# Patient Record
Sex: MALE | Race: WHITE | URBAN - METROPOLITAN AREA
[De-identification: names, ages, dates, MRNs, and addresses within clinical notes are randomized per-mention and may not be internally consistent; named-entity substitution may affect disease eponyms.]

---

## 2018-04-06 ENCOUNTER — OFFICE VISIT (OUTPATIENT)
Dept: INTERNAL MEDICINE CLINIC | Age: 28
End: 2018-04-06

## 2018-04-06 VITALS
HEART RATE: 68 BPM | DIASTOLIC BLOOD PRESSURE: 64 MMHG | SYSTOLIC BLOOD PRESSURE: 102 MMHG | WEIGHT: 133 LBS | RESPIRATION RATE: 12 BRPM

## 2018-04-06 DIAGNOSIS — J96.11 CHRONIC HYPOXEMIC RESPIRATORY FAILURE (HCC): ICD-10-CM

## 2018-04-06 DIAGNOSIS — R06.89 ALVEOLAR HYPOVENTILATION: ICD-10-CM

## 2018-04-06 DIAGNOSIS — I10 ESSENTIAL HYPERTENSION: ICD-10-CM

## 2018-04-06 DIAGNOSIS — G71.01 DUCHENNE MUSCULAR DYSTROPHY (HCC): ICD-10-CM

## 2018-04-06 DIAGNOSIS — R60.0 EDEMA OF LOWER EXTREMITY: ICD-10-CM

## 2018-04-06 DIAGNOSIS — K05.10 GINGIVITIS, CHRONIC: ICD-10-CM

## 2018-04-06 DIAGNOSIS — Z78.9 ADVANCE DIRECTIVE IN CHART: ICD-10-CM

## 2018-04-06 DIAGNOSIS — G47.33 OBSTRUCTIVE SLEEP APNEA: ICD-10-CM

## 2018-04-06 DIAGNOSIS — R00.0 SINUS TACHYCARDIA: ICD-10-CM

## 2018-04-06 DIAGNOSIS — I51.4 MYOCARDIAL FIBROSIS (HCC): ICD-10-CM

## 2018-04-06 PROBLEM — I42.5 OTHER RESTRICTIVE CARDIOMYOPATHY (HCC): Status: ACTIVE | Noted: 2018-04-06

## 2018-04-06 PROCEDURE — 99203 OFFICE O/P NEW LOW 30 MIN: CPT | Performed by: INTERNAL MEDICINE

## 2018-04-06 RX ORDER — CHLORHEXIDINE GLUCONATE 0.12 MG/ML
15 RINSE ORAL 2 TIMES DAILY
Qty: 1800 ML | Refills: 5 | Status: SHIPPED | OUTPATIENT
Start: 2018-04-06 | End: 2018-05-06

## 2018-04-06 RX ORDER — LORATADINE 10 MG/1
10 CAPSULE, LIQUID FILLED ORAL DAILY
COMMUNITY

## 2018-04-06 RX ORDER — FLUTICASONE PROPIONATE 50 MCG
2 SPRAY, SUSPENSION (ML) NASAL DAILY
COMMUNITY

## 2018-04-06 RX ORDER — POLYETHYLENE GLYCOL 3350 17 G/17G
17 POWDER, FOR SOLUTION ORAL DAILY
COMMUNITY
End: 2018-07-25 | Stop reason: SDUPTHER

## 2018-04-06 RX ORDER — LANOLIN/MINERAL OIL
LOTION (ML) TOPICAL PRN
COMMUNITY
End: 2019-05-15

## 2018-04-06 RX ORDER — SPIRONOLACTONE 25 MG/1
25 TABLET ORAL DAILY
COMMUNITY

## 2018-04-06 RX ORDER — ALENDRONATE SODIUM 70 MG/1
1 TABLET ORAL WEEKLY
COMMUNITY
Start: 2018-04-04

## 2018-04-06 RX ORDER — PREDNISONE 20 MG/1
20 TABLET ORAL DAILY
COMMUNITY

## 2018-04-06 RX ORDER — CARVEDILOL 12.5 MG/1
6.25 TABLET ORAL 2 TIMES DAILY
COMMUNITY
Start: 2018-04-04

## 2018-04-06 RX ORDER — MUPIROCIN CALCIUM 20 MG/G
CREAM TOPICAL DAILY
COMMUNITY
End: 2019-05-15 | Stop reason: ALTCHOICE

## 2018-04-06 RX ORDER — OMEPRAZOLE 20 MG/1
20 CAPSULE, DELAYED RELEASE ORAL DAILY
COMMUNITY
End: 2019-05-15 | Stop reason: ALTCHOICE

## 2018-04-06 RX ORDER — SIMETHICONE 80 MG
80 TABLET,CHEWABLE ORAL EVERY 6 HOURS PRN
COMMUNITY
End: 2019-01-25 | Stop reason: SDUPTHER

## 2018-04-06 RX ORDER — LISINOPRIL 10 MG/1
10 TABLET ORAL DAILY
COMMUNITY
End: 2019-05-15 | Stop reason: DRUGHIGH

## 2018-04-06 RX ORDER — DOCUSATE SODIUM 50 MG/5ML
50 LIQUID ORAL DAILY
COMMUNITY
End: 2019-05-15 | Stop reason: ALTCHOICE

## 2018-04-06 RX ORDER — MELATONIN
2 DAILY
COMMUNITY
Start: 2018-03-26

## 2018-04-06 RX ORDER — MENTHOL AND ZINC OXIDE .44; 20.625 G/100G; G/100G
OINTMENT TOPICAL DAILY
COMMUNITY

## 2018-04-06 ASSESSMENT — ENCOUNTER SYMPTOMS: SHORTNESS OF BREATH: 0

## 2018-05-09 ENCOUNTER — OFFICE VISIT (OUTPATIENT)
Dept: INTERNAL MEDICINE CLINIC | Age: 28
End: 2018-05-09

## 2018-05-09 ENCOUNTER — TELEPHONE (OUTPATIENT)
Dept: INTERNAL MEDICINE CLINIC | Age: 28
End: 2018-05-09

## 2018-05-09 VITALS
DIASTOLIC BLOOD PRESSURE: 64 MMHG | SYSTOLIC BLOOD PRESSURE: 112 MMHG | HEART RATE: 76 BPM | OXYGEN SATURATION: 99 % | RESPIRATION RATE: 12 BRPM

## 2018-05-09 DIAGNOSIS — B36.9 FUNGAL DERMATITIS: ICD-10-CM

## 2018-05-09 DIAGNOSIS — R60.0 EDEMA OF LOWER EXTREMITY: Primary | ICD-10-CM

## 2018-05-09 PROCEDURE — G8421 BMI NOT CALCULATED: HCPCS | Performed by: INTERNAL MEDICINE

## 2018-05-09 PROCEDURE — 99214 OFFICE O/P EST MOD 30 MIN: CPT | Performed by: INTERNAL MEDICINE

## 2018-05-09 PROCEDURE — G8427 DOCREV CUR MEDS BY ELIG CLIN: HCPCS | Performed by: INTERNAL MEDICINE

## 2018-05-09 PROCEDURE — 1036F TOBACCO NON-USER: CPT | Performed by: INTERNAL MEDICINE

## 2018-05-09 RX ORDER — TAMSULOSIN HYDROCHLORIDE 0.4 MG/1
0.4 CAPSULE ORAL DAILY
COMMUNITY
End: 2019-05-15 | Stop reason: ALTCHOICE

## 2018-05-09 RX ORDER — ELASTIC BANDAGE 3"
BANDAGE TOPICAL
Qty: 6 EACH | Refills: 0 | Status: SHIPPED | OUTPATIENT
Start: 2018-05-09

## 2018-05-09 RX ORDER — NYSTATIN 100000 [USP'U]/G
POWDER TOPICAL
Qty: 57.6 G | Refills: 5 | Status: SHIPPED | OUTPATIENT
Start: 2018-05-09

## 2018-05-10 PROBLEM — B36.9 FUNGAL DERMATITIS: Status: ACTIVE | Noted: 2018-05-10

## 2018-05-10 ASSESSMENT — ENCOUNTER SYMPTOMS
COLOR CHANGE: 0
SHORTNESS OF BREATH: 0
COUGH: 0
ABDOMINAL DISTENTION: 0

## 2018-06-25 ENCOUNTER — TELEPHONE (OUTPATIENT)
Dept: INTERNAL MEDICINE CLINIC | Age: 28
End: 2018-06-25

## 2018-06-27 ENCOUNTER — OFFICE VISIT (OUTPATIENT)
Dept: INTERNAL MEDICINE CLINIC | Age: 28
End: 2018-06-27

## 2018-06-27 VITALS — RESPIRATION RATE: 12 BRPM | DIASTOLIC BLOOD PRESSURE: 64 MMHG | SYSTOLIC BLOOD PRESSURE: 102 MMHG | HEART RATE: 78 BPM

## 2018-06-27 DIAGNOSIS — I89.0 LYMPHEDEMA OF BOTH LOWER EXTREMITIES: Primary | ICD-10-CM

## 2018-06-27 PROCEDURE — 99215 OFFICE O/P EST HI 40 MIN: CPT | Performed by: INTERNAL MEDICINE

## 2018-06-27 PROCEDURE — G8427 DOCREV CUR MEDS BY ELIG CLIN: HCPCS | Performed by: INTERNAL MEDICINE

## 2018-06-27 PROCEDURE — 1036F TOBACCO NON-USER: CPT | Performed by: INTERNAL MEDICINE

## 2018-06-27 PROCEDURE — G8421 BMI NOT CALCULATED: HCPCS | Performed by: INTERNAL MEDICINE

## 2018-06-27 RX ORDER — FERROUS SULFATE 325(65) MG
325 TABLET ORAL 2 TIMES DAILY
COMMUNITY
End: 2019-05-15 | Stop reason: ALTCHOICE

## 2018-06-27 ASSESSMENT — PATIENT HEALTH QUESTIONNAIRE - PHQ9
SUM OF ALL RESPONSES TO PHQ QUESTIONS 1-9: 0
1. LITTLE INTEREST OR PLEASURE IN DOING THINGS: 0
2. FEELING DOWN, DEPRESSED OR HOPELESS: 0
SUM OF ALL RESPONSES TO PHQ9 QUESTIONS 1 & 2: 0

## 2018-06-28 ASSESSMENT — ENCOUNTER SYMPTOMS: SHORTNESS OF BREATH: 1

## 2018-07-02 ENCOUNTER — TELEPHONE (OUTPATIENT)
Dept: INTERNAL MEDICINE CLINIC | Age: 28
End: 2018-07-02

## 2018-07-11 ENCOUNTER — OFFICE VISIT (OUTPATIENT)
Dept: INTERNAL MEDICINE CLINIC | Age: 28
End: 2018-07-11

## 2018-07-11 VITALS
HEART RATE: 72 BPM | DIASTOLIC BLOOD PRESSURE: 62 MMHG | OXYGEN SATURATION: 98 % | RESPIRATION RATE: 12 BRPM | SYSTOLIC BLOOD PRESSURE: 100 MMHG

## 2018-07-11 DIAGNOSIS — G71.01 DUCHENNE MUSCULAR DYSTROPHY (HCC): ICD-10-CM

## 2018-07-11 DIAGNOSIS — I89.0 LYMPHEDEMA OF BOTH LOWER EXTREMITIES: Primary | ICD-10-CM

## 2018-07-11 DIAGNOSIS — J96.11 CHRONIC HYPOXEMIC RESPIRATORY FAILURE (HCC): ICD-10-CM

## 2018-07-11 DIAGNOSIS — I51.4 MYOCARDIAL FIBROSIS (HCC): ICD-10-CM

## 2018-07-11 PROCEDURE — 1036F TOBACCO NON-USER: CPT | Performed by: INTERNAL MEDICINE

## 2018-07-11 PROCEDURE — G8427 DOCREV CUR MEDS BY ELIG CLIN: HCPCS | Performed by: INTERNAL MEDICINE

## 2018-07-11 PROCEDURE — 1111F DSCHRG MED/CURRENT MED MERGE: CPT | Performed by: INTERNAL MEDICINE

## 2018-07-11 PROCEDURE — 99214 OFFICE O/P EST MOD 30 MIN: CPT | Performed by: INTERNAL MEDICINE

## 2018-07-11 PROCEDURE — G8421 BMI NOT CALCULATED: HCPCS | Performed by: INTERNAL MEDICINE

## 2018-07-11 ASSESSMENT — ENCOUNTER SYMPTOMS
CHEST TIGHTNESS: 0
SHORTNESS OF BREATH: 0

## 2018-07-11 NOTE — ASSESSMENT & PLAN NOTE
On sip vent prn during the day with cough assist after lunch and at HS. Managed by Dr. Tosha Cr at [address]. Also on BiPap over night.  Related to DMD.

## 2018-07-11 NOTE — PROGRESS NOTES
Patient: Winston Arteaga is a 33 y.o. male who presents today with the following Chief Complaint(s):  Chief Complaint   Patient presents with   [address] from [facility]     d/c from [facility]. [facility] [address] 7/5/18    Edema     edema both feet       [facility] for significant LE edema. Was diuresed about 3 liters with Lasix and elevation of legs. He did have a \"bump\" in his creatinine with diuresis. Re-checked yesterday and was back down. Was not discharged home on Lasix. Had echo done 6/28 that was normal. Had LE dopplers done that were negative. Had lymphedema pumps in the hospital and a hospital bed. There has been no progress on getting him a hospital bed: needs hospital bed with adjustable positions, adjustable height and needs an air mattress to help avoid bed sores as he is not able to shift positions. Does have a hospital bed at home that he will not get in as he does not like the mattress and does not fully adjust to elevate his feet, bends at knees. Has appointment with Dr. Yobani Dukes (vasular) at [facility] 8/14. Since discharge home, feet have been swelling more. Face to face evaluation for hospital bed and lymph edema pump. Needs hospital bed with adjustable positions d/t lymphedema and DMD. Unable to position self in bed d/t musclar dystrophy. Unable to elevated legs adequately in wheel chair or current hospital bed. Will likely need frequent admissions for lymphedema w/out appropriate hospital bed. Needs lymphedema pumps for significant swelling of feet. Unable to use stockings d/t position of swelling (severe swelling in feet) and has failed wraps. Will likely need readmission without home treatment.          No Known Allergies   Past Medical History:   Diagnosis Date    Hypertension     Muscular dystrophy [facility])       Past Surgical History:   Procedure Laterality Date    GASTROSTOMY TUBE PLACEMENT  04/2017    closure 3/2018    SPINAL FUSION 2004      Social History     Social History    Marital status: Single     Spouse name: N/A    Number of children: N/A    Years of education: N/A     Occupational History    unemployed      Social History Main Topics    Smoking status: Never Smoker    Smokeless tobacco: Never Used    Alcohol use Yes      Comment: rarely    Drug use: No    Sexual activity: Not on file     Other Topics Concern    Not on file     Social History Narrative    No narrative on file     Family History   Problem Relation Age of Onset    Other Mother 35        car accident    Cancer Father 46        Leukemia    Dementia Paternal Grandmother     Lung Cancer Paternal Grandfather         Outpatient Medications Prior to Visit   Medication Sig Dispense Refill    ferrous sulfate 325 (65 Fe) MG tablet Take 325 mg by mouth 2 times daily      tamsulosin (FLOMAX) 0.4 MG capsule Take 0.4 mg by mouth daily      nystatin (MYCOSTATIN) 724923 UNIT/GM powder Apply 3 times daily. 57.6 g 5    Elastic Bandages & Supports (ACE BANDAGE SELF-ADHERING) MISC Comprilan wraps or similar. Will need 2-3 per leg. Wrap each leg in am starting at toes up to knees. Remove at HS. 6 each 0    docusate sodium (COLACE) 150 MG/15ML liquid Take 50 mg by mouth daily      fluticasone (FLONASE) 50 MCG/ACT nasal spray 2 sprays by Nasal route daily      simethicone (GAS RELIEF) 80 MG chewable tablet Take 80 mg by mouth every 6 hours as needed for Flatulence      lisinopril (PRINIVIL;ZESTRIL) 10 MG tablet Take 10 mg by mouth daily      loratadine (CLARITIN) 10 MG capsule Take 10 mg by mouth daily      Melatonin 1 MG/ML LIQD Take 6 mg by mouth nightly as needed      menthol-zinc oxide (CALMOSEPTINE) 0.44-20.625 % OINT ointment Apply topically daily Max 30 ml per day.  miconazole nitrate 2 % OINT Apply topically daily as needed      mupirocin (BACTROBAN) 2 % cream Apply topically daily Apply topically 3 times daily.       omeprazole (PRILOSEC) 20 MG delayed release capsule Take 20 mg by mouth daily      petrolatum-zinc oxide 49-15 % OINT ointment Apply topically as needed      polyethylene glycol (GLYCOLAX) powder Take 17 g by mouth daily      predniSONE (DELTASONE) 20 MG tablet Take 20 mg by mouth daily      spironolactone (ALDACTONE) 25 MG tablet Take 25 mg by mouth daily      therapeutic (THERA-DERM) LOTN Apply topically as needed      alendronate (FOSAMAX) 70 MG tablet Take 1 tablet by mouth once a week      carvedilol (COREG) 12.5 MG tablet Take 1 tablet by mouth 2 times daily      Cholecalciferol (VITAMIN D3) 1000 units TABS Take 2 tablets by mouth daily       No facility-administered medications prior to visit. Patient's past medical history, surgical history, family history, medications,  and allergies  were all reviewed and updated as appropriate today. Review of Systems   Constitutional: Negative for appetite change, fatigue and fever. Respiratory: Negative for chest tightness and shortness of breath. Cardiovascular: Positive for leg swelling. Negative for chest pain. Skin: Negative for rash. /62 (Site: Right Arm)   Pulse 72   Resp 12   SpO2 98%   Physical Exam   Constitutional: He is oriented to person, place, and time. He appears well-developed and well-nourished. He is cooperative. He does not appear ill. No distress. Wheelchair bound, sip vent dependent   HENT:   Head: Normocephalic. Right Ear: Tympanic membrane, external ear and ear canal normal.   Left Ear: Tympanic membrane, external ear and ear canal normal.   Nose: Nose normal. No mucosal edema or rhinorrhea. Mouth/Throat: Oropharynx is clear and moist and mucous membranes are normal.   Gingivitis, plaque build up on teeth. Eyes: Conjunctivae and EOM are normal. Pupils are equal, round, and reactive to light. Right eye exhibits no discharge. Left eye exhibits no discharge. No scleral icterus. Neck: Carotid bruit is not present.  No thyroid mass present. Cardiovascular: Normal rate, regular rhythm, normal heart sounds and intact distal pulses. No murmur heard. Left lower leg with no edema, foot with 4+ edema, overlying skin not shiny, no breakdown, no erythema    Right lower leg with 1-2+ pitting edema from mid-calf to ankle, foot and ankle with 5+ tense edema. Overlying skin is shiny around ankle and over top of foot. No break down but medial malleolus area with 3 cm blister with likely bloody fluid. Swelling is slightly less than it was on 6/27/18. Pulmonary/Chest: Effort normal. He has no wheezes. He has no rales. He exhibits no tenderness. Abdominal: Soft. Bowel sounds are normal. He exhibits no mass. There is no tenderness. Musculoskeletal: Edema: b/l LE. Left lower leg with no edema, foot with 5+ edema, overlying skin not shiny, no breakdown, no erythema    Right lower leg with 1-2+ pitting edema from mid-calf to ankle, foot and ankle with 5+ tense edema. Overlying skin is shiny around ankle and over top of foot. No break down but medial malleolus area with 3 cm blister with likely bloody fluid. Neurological: He is alert and oriented to person, place, and time. He displays atrophy. He exhibits abnormal muscle tone (flaccid). Gait (non-ambulatory) abnormal.   Arm strength 2/5 b/l. Trunk weakness/some difficulty controlling trunk movements. Speech weak   Skin: Skin is warm and dry. No pallor. Skin over right foot is shiny, has about a 3 cm sized raised area over his medial malleolus that looks like a blister forming with blood vessels underneath. Skin over left foot is not shiny. No skin breakdown. Psychiatric: He has a normal mood and affect. His behavior is normal. Judgment and thought content normal.       ASSESSMENT/PLAN:    Problem List Items Addressed This Visit     Chronic hypoxemic respiratory failure (HCC)     On sip vent prn during the day with cough assist after lunch and at HS.  [facility] Also on BiPap over night. Related to DMD.         Relevant Orders    DME Order for Hospital Bed as OP    Duchenne muscular dystrophy ([address]     Diagnosed at age 12. He continues to follow with neurology at [facility].  He requires a hospital bed with a pressure relieving mattress as he is unable to adjust his position due to significant muscle weakness and lack of muscle control. He requires adjustable positioning of the bed to elevate his head and feet as needed. Relevant Orders    DC DISCHARGE MEDS RECONCILED W/ CURRENT OUTPATIENT MED LIST (Completed)    DME Order for Hospital Bed as OP    Lymphedema of both lower extremities - Primary     Per caregiver his lymphedema had significantly improved while he was in the hospital.  He was discharged home on Thursday, 7/5/18 and 5 days later, on 7/10/18, his feet look almost as bad as they did the day prior to his admission to [facility].  He has not had his feet elevated since discharge. He has not had lymphedema pumps since discharge. He was discharged home with elastic compression wraps but he has been unable to use these. In the hospital he had an echocardiogram done with normal biventricular function and normal ejection fraction. He had lower extremity Dopplers that were done that were unremarkable. He was treated with elevation of his legs, lymphedema pump, and Lasix. He was not discharged home with Lasix as Lasix was causing hypokalemia. Lasix would not be appropriate as his edema is secondary to extravascular volume,  not intravascular volume and Lasix would make him intravascularly deplete. He has an appointment with Dr. India Gunderson in vascular surgery at [facility] on 8/14/18 but my concern is if he has not seen sooner or he will end up back in the hospital with significant edema. We will try to see if we can move this appointment sooner.   In the meantime he is definitely appropriate for a hospital bed that is  mupirocin (BACTROBAN) 2 % cream Apply topically daily Apply topically 3 times daily.  omeprazole (PRILOSEC) 20 MG delayed release capsule Take 20 mg by mouth daily      petrolatum-zinc oxide 49-15 % OINT ointment Apply topically as needed      polyethylene glycol (GLYCOLAX) powder Take 17 g by mouth daily      predniSONE (DELTASONE) 20 MG tablet Take 20 mg by mouth daily      spironolactone (ALDACTONE) 25 MG tablet Take 25 mg by mouth daily      therapeutic (THERA-DERM) LOTN Apply topically as needed      alendronate (FOSAMAX) 70 MG tablet Take 1 tablet by mouth once a week      carvedilol (COREG) 12.5 MG tablet Take 1 tablet by mouth 2 times daily      Cholecalciferol (VITAMIN D3) 1000 units TABS Take 2 tablets by mouth daily       No current facility-administered medications for this visit. Return in about 4 weeks (around 8/8/2018) for sooner if needed.        Post-Discharge Transitional Care Management Services      [facility] Konstantin Nelson   YOB: 1990    Date of Office Visit:  7/11/2018  Date of Hospital Admission: 6/28/18  Date of Hospital Discharge: 7/5/18      Care management risk score Rising risk (score 2-5) and Complex Care (Scores >=6): 4       Patient Active Problem List   Diagnosis    Myocardial fibrosis ([address]    Duchenne muscular dystrophy ([address]    Essential hypertension    Sinus tachycardia    Gingivitis, chronic    Lymphedema of both lower extremities    Alveolar hypoventilation    Chronic hypoxemic respiratory failure ([address]    Obstructive sleep apnea    Advance directive in chart    Fungal dermatitis       No Known Allergies    Medications listed as ordered at the time of discharge from hospital  No new medications    Medications marked \"taking\" at this time  Outpatient Prescriptions Marked as Taking for the 7/11/18 encounter (Office Visit) with Larissa Millard, DO   Medication Sig Dispense Refill    ferrous sulfate 325 (65 Fe) MG tablet Take 325 mg

## 2018-07-11 NOTE — ASSESSMENT & PLAN NOTE
Diagnosed at age 12. He continues to follow with neurology at [facility].  He requires a hospital bed with a pressure relieving mattress as he is unable to adjust his position due to significant muscle weakness and lack of muscle control. He requires adjustable positioning of the bed to elevate his head and feet as needed.

## 2018-07-17 ENCOUNTER — TELEPHONE (OUTPATIENT)
Dept: INTERNAL MEDICINE CLINIC | Age: 28
End: 2018-07-17

## 2018-07-25 RX ORDER — POLYETHYLENE GLYCOL 3350 17 G/17G
17 POWDER, FOR SOLUTION ORAL DAILY
Qty: 527 G | Refills: 5 | Status: SHIPPED | OUTPATIENT
Start: 2018-07-25 | End: 2019-01-02 | Stop reason: SDUPTHER

## 2018-08-08 ENCOUNTER — OFFICE VISIT (OUTPATIENT)
Dept: INTERNAL MEDICINE CLINIC | Age: 28
End: 2018-08-08

## 2018-08-08 VITALS
SYSTOLIC BLOOD PRESSURE: 102 MMHG | HEART RATE: 70 BPM | RESPIRATION RATE: 12 BRPM | OXYGEN SATURATION: 98 % | DIASTOLIC BLOOD PRESSURE: 64 MMHG

## 2018-08-08 DIAGNOSIS — R30.0 DYSURIA: ICD-10-CM

## 2018-08-08 DIAGNOSIS — I89.0 LYMPHEDEMA OF BOTH LOWER EXTREMITIES: Primary | ICD-10-CM

## 2018-08-08 DIAGNOSIS — S91.301A OPEN WOUND OF RIGHT FOOT, INITIAL ENCOUNTER: ICD-10-CM

## 2018-08-08 PROBLEM — N30.00 ACUTE CYSTITIS WITHOUT HEMATURIA: Status: ACTIVE | Noted: 2018-08-08

## 2018-08-08 PROCEDURE — G8421 BMI NOT CALCULATED: HCPCS | Performed by: INTERNAL MEDICINE

## 2018-08-08 PROCEDURE — 1036F TOBACCO NON-USER: CPT | Performed by: INTERNAL MEDICINE

## 2018-08-08 PROCEDURE — G8427 DOCREV CUR MEDS BY ELIG CLIN: HCPCS | Performed by: INTERNAL MEDICINE

## 2018-08-08 PROCEDURE — 99214 OFFICE O/P EST MOD 30 MIN: CPT | Performed by: INTERNAL MEDICINE

## 2018-08-08 RX ORDER — GAUZE BANDAGE 4" X 4"
1 SPONGE TOPICAL DAILY
Qty: 30 EACH | Refills: 2 | Status: SHIPPED | OUTPATIENT
Start: 2018-08-08

## 2018-08-08 ASSESSMENT — ENCOUNTER SYMPTOMS
SHORTNESS OF BREATH: 0
DIARRHEA: 0
CONSTIPATION: 0
CHEST TIGHTNESS: 0

## 2018-08-08 NOTE — PROGRESS NOTES
history, surgical history, family history, medications,  and allergies  were all reviewed and updated as appropriate today. Review of Systems   Constitutional: Negative for appetite change, fatigue and fever. Respiratory: Negative for chest tightness and shortness of breath. Cardiovascular: Negative for chest pain. Gastrointestinal: Negative for constipation and diarrhea. Skin: Negative for rash. /64 (Site: Right Arm, Position: Sitting)   Pulse 70   Resp 12   SpO2 98%   Physical Exam   Constitutional: He is oriented to person, place, and time. He appears well-developed and well-nourished. He is cooperative. He does not appear ill. No distress. Wheelchair bound, sip vent dependent   HENT:   Head: Normocephalic. Right Ear: Tympanic membrane and ear canal normal.   Left Ear: Tympanic membrane and ear canal normal.   Nose: No mucosal edema or rhinorrhea. Mouth/Throat: Mucous membranes are normal.   Gingivitis, plaque build up on teeth. Eyes: Conjunctivae and EOM are normal. Pupils are equal, round, and reactive to light. Right eye exhibits no discharge. Left eye exhibits no discharge. No scleral icterus. Neck: Carotid bruit is not present. No thyroid mass present. Cardiovascular: Normal rate, regular rhythm, normal heart sounds and intact distal pulses. No murmur heard. Left lower leg with no edema, foot with 4+ edema, overlying skin not shiny, no breakdown, no erythema    Right lower leg with 1-2+ pitting edema from distal 1/3 calf/shin to ankle, foot and ankle with 5+ tense edema. Overlying skin is not shiny around ankle and over top of foot. Swelling is slightly less than it was in July. Pulmonary/Chest: Effort normal. He has no wheezes. He has no rales. He exhibits no tenderness. Abdominal: Soft. Bowel sounds are normal. He exhibits no mass. There is no tenderness. Musculoskeletal: Edema: b/l LE.    Left lower leg with no edema, foot with 5+ edema, overlying skin not shiny, no breakdown, no erythema    Right lower leg with 1-2+ pitting edema from mid-calf to ankle, foot and ankle with 5+ tense edema. Overlying skin is shiny around ankle and over top of foot. No break down but medial malleolus area with 3 cm blister with likely bloody fluid. Neurological: He is alert and oriented to person, place, and time. He displays atrophy. He exhibits abnormal muscle tone (flaccid). Gait (non-ambulatory) abnormal.   Arm strength 2/5 b/l. Trunk weakness/some difficulty controlling trunk movements. Speech weak   Skin: Skin is warm and dry. No pallor. Skin over right foot is not shiny, has about a 5 cm sized open area with 3cm white central eschar, leaking serous fluid. Foot and lower leg to distal 1/3 of shin are pink. Does not appear cellulitic. Left foot with significant swelling, not shiny, not tense. Psychiatric: He has a normal mood and affect. His behavior is normal. Judgment and thought content normal.       ASSESSMENT/PLAN:    Problem List Items Addressed This Visit     Lymphedema of both lower extremities - Primary     Relatively stable at present. He still is not sleeping in bed and is not getting his legs elevated which is certainly worsening his condition. The swelling is located only to his feet at this time. Of concern is he does have an open area on his left medial foot that we are dressing with wound care. He does have an appointment with vascular Week. And he does use compression wraps to help with the swelling. Open wound of right foot     Wound cultures obtained. Wound dressed with Adaptic and PolyMem pink. Advised against use of hydrogen peroxide but okay to use over-the-counter topical antibiotic cream.  Referral placed for wound management with his home health agency [facility]).  Does not appear to be infected based on exam.         Relevant Orders    External Referral To [facility] CULTURE    Dysuria Most recent urine cultures are from 7/4/18 and showed E. coli which was resistant to most oral medications. He was treated with Keflex 500 mg b.i.d. Cultures do show that it was sensitive to cefazolin so the Keflex should have helped. Macrobid seemed to be the only oral medication that this particular strain of E. coli was sensitive to so we'll treat empirically with E. coli. I have given them a urine cup to take home with them and an order for UA with [facility] as he is unable to provide a sample today in the office. Relevant Orders    URINE CULTURE    Urinalysis          Current Outpatient Prescriptions   Medication Sig Dispense Refill    Gauze Pads & Dressings (POLYMEM DRESSING) 5\"X5\" PADS 1 each by Does not apply route daily 30 each 2    Wound Dressings (ADAPTIC NON-ADHERING DRESSING) PADS Apply 1 each topically daily 30 each 2    polyethylene glycol (GLYCOLAX) powder Take 17 g by mouth daily 527 g 5    ferrous sulfate 325 (65 Fe) MG tablet Take 325 mg by mouth 2 times daily      tamsulosin (FLOMAX) 0.4 MG capsule Take 0.4 mg by mouth daily      nystatin (MYCOSTATIN) 515497 UNIT/GM powder Apply 3 times daily. 57.6 g 5    Elastic Bandages & Supports (ACE BANDAGE SELF-ADHERING) MISC Comprilan wraps or similar. Will need 2-3 per leg. Wrap each leg in am starting at toes up to knees. Remove at HS. 6 each 0    docusate sodium (COLACE) 150 MG/15ML liquid Take 50 mg by mouth daily      fluticasone (FLONASE) 50 MCG/ACT nasal spray 2 sprays by Nasal route daily      simethicone (GAS RELIEF) 80 MG chewable tablet Take 80 mg by mouth every 6 hours as needed for Flatulence      lisinopril (PRINIVIL;ZESTRIL) 10 MG tablet Take 10 mg by mouth daily      loratadine (CLARITIN) 10 MG capsule Take 10 mg by mouth daily      Melatonin 1 MG/ML LIQD Take 6 mg by mouth nightly as needed      menthol-zinc oxide (CALMOSEPTINE) 0.44-20.625 % OINT ointment Apply topically daily Max 30 ml per day.       miconazole nitrate 2 % OINT Apply topically daily as needed      mupirocin (BACTROBAN) 2 % cream Apply topically daily Apply topically 3 times daily.  omeprazole (PRILOSEC) 20 MG delayed release capsule Take 20 mg by mouth daily      petrolatum-zinc oxide 49-15 % OINT ointment Apply topically as needed      predniSONE (DELTASONE) 20 MG tablet Take 20 mg by mouth daily      spironolactone (ALDACTONE) 25 MG tablet Take 25 mg by mouth daily      therapeutic (THERA-DERM) LOTN Apply topically as needed      alendronate (FOSAMAX) 70 MG tablet Take 1 tablet by mouth once a week      carvedilol (COREG) 12.5 MG tablet Take 1 tablet by mouth 2 times daily      Cholecalciferol (VITAMIN D3) 1000 units TABS Take 2 tablets by mouth daily       No current facility-administered medications for this visit. Return in about 4 weeks (around 9/5/2018).

## 2018-08-08 NOTE — ASSESSMENT & PLAN NOTE
Wound cultures obtained. Wound dressed with Adaptic and PolyMem pink. Advised against use of hydrogen peroxide but okay to use over-the-counter topical antibiotic cream.  Referral placed for wound management with his home health agency [facility]).  Does not appear to be infected based on exam.

## 2018-08-09 ENCOUNTER — TELEPHONE (OUTPATIENT)
Dept: INTERNAL MEDICINE CLINIC | Age: 28
End: 2018-08-09

## 2018-08-09 DIAGNOSIS — R30.0 DYSURIA: ICD-10-CM

## 2018-08-09 DIAGNOSIS — N30.00 ACUTE CYSTITIS WITHOUT HEMATURIA: Primary | ICD-10-CM

## 2018-08-09 LAB
BACTERIA: ABNORMAL /HPF
BILIRUBIN URINE: NEGATIVE
BLOOD, URINE: NEGATIVE
CLARITY: CLEAR
COLOR: YELLOW
CRYSTALS, UA: ABNORMAL /HPF
EPITHELIAL CELLS, UA: 1 /HPF (ref 0–5)
GLUCOSE URINE: NEGATIVE MG/DL
HYALINE CASTS: 9 /LPF (ref 0–8)
KETONES, URINE: NEGATIVE MG/DL
LEUKOCYTE ESTERASE, URINE: ABNORMAL
MICROSCOPIC EXAMINATION: YES
NITRITE, URINE: NEGATIVE
PH UA: >=9
PROTEIN UA: 100 MG/DL
RBC UA: 364 /HPF (ref 0–4)
SPECIFIC GRAVITY UA: <=1.005
URINE TYPE: ABNORMAL
UROBILINOGEN, URINE: 0.2 E.U./DL
WBC UA: 105 /HPF (ref 0–5)

## 2018-08-09 RX ORDER — NITROFURANTOIN 25; 75 MG/1; MG/1
100 CAPSULE ORAL 2 TIMES DAILY
Qty: 20 CAPSULE | Refills: 0 | Status: SHIPPED | OUTPATIENT
Start: 2018-08-09 | End: 2018-08-19

## 2018-08-10 ENCOUNTER — TELEPHONE (OUTPATIENT)
Dept: INTERNAL MEDICINE CLINIC | Age: 28
End: 2018-08-10

## 2018-08-10 DIAGNOSIS — S91.301D OPEN WOUND OF RIGHT FOOT, SUBSEQUENT ENCOUNTER: Primary | ICD-10-CM

## 2018-08-10 DIAGNOSIS — G71.01 DUCHENNE MUSCULAR DYSTROPHY (HCC): ICD-10-CM

## 2018-08-10 DIAGNOSIS — S91.301A OPEN WOUND OF RIGHT FOOT, INITIAL ENCOUNTER: Primary | ICD-10-CM

## 2018-08-10 RX ORDER — AMOXICILLIN AND CLAVULANATE POTASSIUM 875; 125 MG/1; MG/1
1 TABLET, FILM COATED ORAL 2 TIMES DAILY
Qty: 14 TABLET | Refills: 0 | Status: SHIPPED | OUTPATIENT
Start: 2018-08-10 | End: 2018-08-17

## 2018-08-10 RX ORDER — CIPROFLOXACIN 500 MG/1
500 TABLET, FILM COATED ORAL 2 TIMES DAILY
Qty: 14 TABLET | Refills: 0 | Status: SHIPPED | OUTPATIENT
Start: 2018-08-10 | End: 2018-08-17

## 2018-08-12 LAB
ORGANISM: ABNORMAL
URINE CULTURE, ROUTINE: ABNORMAL
URINE CULTURE, ROUTINE: ABNORMAL

## 2018-08-13 ENCOUNTER — TELEPHONE (OUTPATIENT)
Dept: INTERNAL MEDICINE CLINIC | Age: 28
End: 2018-08-13

## 2018-08-14 LAB
ANAEROBIC CULTURE: ABNORMAL
GRAM STAIN RESULT: ABNORMAL
ORGANISM: ABNORMAL
WOUND/ABSCESS: ABNORMAL

## 2018-08-15 ENCOUNTER — TELEPHONE (OUTPATIENT)
Dept: INTERNAL MEDICINE CLINIC | Age: 28
End: 2018-08-15

## 2018-08-15 NOTE — TELEPHONE ENCOUNTER
Deepak Hart call from [facility])and request verbal for for nurse care . She is asking it is ok to use Polystroin on pt wound after Triple antibiotic finished? ?    She request call back

## 2018-08-17 ENCOUNTER — TELEPHONE (OUTPATIENT)
Dept: INTERNAL MEDICINE CLINIC | Age: 28
End: 2018-08-17

## 2018-08-17 NOTE — TELEPHONE ENCOUNTER
I called Esther Anne w/[facility]. She said she will not be following this pt. She said she suggested using polysporin after triple ABX was finished and an HHN should be out on 8/20/18 to do wound care.

## 2018-08-29 ENCOUNTER — TELEPHONE (OUTPATIENT)
Dept: INTERNAL MEDICINE CLINIC | Age: 28
End: 2018-08-29

## 2018-08-29 NOTE — TELEPHONE ENCOUNTER
[facility] calling ---this pt has been seeing a uriologist at [facility] and he would like for you to recommend and refer him to an adult urrologist---please let him know---plese call him at [phone]. Thanks.                                                                                                                                                                                                                                                                                                                                                                                                   s

## 2018-08-29 NOTE — TELEPHONE ENCOUNTER
Can have him seen by [facility]- where would he prefer to be seen? [address] or [facility]?   Thanks saw

## 2018-09-27 ENCOUNTER — TELEPHONE (OUTPATIENT)
Dept: INTERNAL MEDICINE CLINIC | Age: 28
End: 2018-09-27

## 2018-09-27 NOTE — TELEPHONE ENCOUNTER
Carisa Mendiola with [facility] calling for a Rx for [employer], [place] bed with frame and [employer] Mattress for Pt. She will be faxing over a CMN form as well that will need to be completed and faxed back over with the Rx.          Fax: [phone]

## 2018-10-04 ENCOUNTER — TELEPHONE (OUTPATIENT)
Dept: INTERNAL MEDICINE CLINIC | Age: 28
End: 2018-10-04

## 2018-10-10 ENCOUNTER — OFFICE VISIT (OUTPATIENT)
Dept: INTERNAL MEDICINE CLINIC | Age: 28
End: 2018-10-10
Payer: MEDICARE

## 2018-10-10 VITALS
HEART RATE: 76 BPM | OXYGEN SATURATION: 98 % | DIASTOLIC BLOOD PRESSURE: 62 MMHG | SYSTOLIC BLOOD PRESSURE: 102 MMHG | RESPIRATION RATE: 12 BRPM

## 2018-10-10 DIAGNOSIS — G71.01 DUCHENNE MUSCULAR DYSTROPHY (HCC): ICD-10-CM

## 2018-10-10 DIAGNOSIS — I51.4 MYOCARDIAL FIBROSIS (HCC): ICD-10-CM

## 2018-10-10 DIAGNOSIS — L03.115 CELLULITIS OF RIGHT LOWER EXTREMITY: ICD-10-CM

## 2018-10-10 DIAGNOSIS — J96.11 CHRONIC HYPOXEMIC RESPIRATORY FAILURE (HCC): ICD-10-CM

## 2018-10-10 DIAGNOSIS — L03.032 CELLULITIS OF TOE OF LEFT FOOT: ICD-10-CM

## 2018-10-10 DIAGNOSIS — I89.0 LYMPHEDEMA OF BOTH LOWER EXTREMITIES: Primary | ICD-10-CM

## 2018-10-10 DIAGNOSIS — Z71.85 VACCINE COUNSELING: ICD-10-CM

## 2018-10-10 PROCEDURE — 99214 OFFICE O/P EST MOD 30 MIN: CPT | Performed by: INTERNAL MEDICINE

## 2018-10-10 PROCEDURE — 1036F TOBACCO NON-USER: CPT | Performed by: INTERNAL MEDICINE

## 2018-10-10 PROCEDURE — G8428 CUR MEDS NOT DOCUMENT: HCPCS | Performed by: INTERNAL MEDICINE

## 2018-10-10 PROCEDURE — G8421 BMI NOT CALCULATED: HCPCS | Performed by: INTERNAL MEDICINE

## 2018-10-10 PROCEDURE — G8484 FLU IMMUNIZE NO ADMIN: HCPCS | Performed by: INTERNAL MEDICINE

## 2018-10-10 RX ORDER — CEPHALEXIN 500 MG/1
500 CAPSULE ORAL 3 TIMES DAILY
Qty: 30 CAPSULE | Refills: 0 | Status: SHIPPED | OUTPATIENT
Start: 2018-10-10 | End: 2018-10-20

## 2018-10-10 ASSESSMENT — ENCOUNTER SYMPTOMS
COUGH: 0
COLOR CHANGE: 0
SHORTNESS OF BREATH: 0

## 2018-10-10 NOTE — PROGRESS NOTES
 therapeutic (THERA-DERM) LOTN Apply topically as needed      alendronate (FOSAMAX) 70 MG tablet Take 1 tablet by mouth once a week      carvedilol (COREG) 12.5 MG tablet Take 1 tablet by mouth 2 times daily      Cholecalciferol (VITAMIN D3) 1000 units TABS Take 2 tablets by mouth daily       No facility-administered medications prior to visit. Patient's past medical history, surgical history, family history, medications,  and allergies  were all reviewed and updated as appropriate today. Review of Systems   Constitutional: Negative for fever. Respiratory: Negative for cough and shortness of breath. Cardiovascular: Positive for leg swelling. Skin: Positive for wound. Negative for color change and rash. /62 (Site: Right Lower Arm)   Pulse 76   Resp 12   SpO2 98%   Physical Exam   Constitutional: He is oriented to person, place, and time. He appears well-developed and well-nourished. He is cooperative. He does not appear ill. No distress. Wheelchair bound, sip vent dependent   HENT:   Head: Normocephalic. Right Ear: Tympanic membrane and ear canal normal.   Left Ear: Tympanic membrane and ear canal normal.   Nose: No mucosal edema or rhinorrhea. Mouth/Throat: Mucous membranes are normal.   Gingivitis, plaque build up on teeth. Eyes: Pupils are equal, round, and reactive to light. Conjunctivae and EOM are normal. Right eye exhibits no discharge. Left eye exhibits no discharge. No scleral icterus. Neck: Carotid bruit is not present. No thyroid mass present. Cardiovascular: Normal rate, regular rhythm and normal heart sounds. No murmur heard. 1+ edema left leg, 4+++ pedal edema left foot- no skin breakdown. 1-2+ edema right leg, 3-4+ swelling around right ankle; 3-4+ swelling of right foot. Pulmonary/Chest: Effort normal. He has no wheezes. He has no rales. He exhibits no tenderness. Abdominal: Soft. Bowel sounds are normal. He exhibits no mass.  There is no 2 % OINT Apply topically daily as needed      mupirocin (BACTROBAN) 2 % cream Apply topically daily Apply topically 3 times daily.  omeprazole (PRILOSEC) 20 MG delayed release capsule Take 20 mg by mouth daily      petrolatum-zinc oxide 49-15 % OINT ointment Apply topically as needed      predniSONE (DELTASONE) 20 MG tablet Take 20 mg by mouth daily      spironolactone (ALDACTONE) 25 MG tablet Take 25 mg by mouth daily      therapeutic (THERA-DERM) LOTN Apply topically as needed      alendronate (FOSAMAX) 70 MG tablet Take 1 tablet by mouth once a week      carvedilol (COREG) 12.5 MG tablet Take 1 tablet by mouth 2 times daily      Cholecalciferol (VITAMIN D3) 1000 units TABS Take 2 tablets by mouth daily       No current facility-administered medications for this visit. Return in about 4 months (around 2/10/2019).

## 2018-10-11 ENCOUNTER — TELEPHONE (OUTPATIENT)
Dept: INTERNAL MEDICINE CLINIC | Age: 28
End: 2018-10-11

## 2018-10-14 NOTE — ASSESSMENT & PLAN NOTE
Followed by Dr. Joana Hodge at [address] [facility] cardiology. On lisinopril 10 mg qd, carvedilol 12. 5 mg bid, and aldactone 25 mg qd. Most recent echocardiogram was 6/27/18 with normal biventricular function and normal ejection fraction. This was done at [address] [facility].

## 2018-10-30 ENCOUNTER — TELEPHONE (OUTPATIENT)
Dept: INTERNAL MEDICINE CLINIC | Age: 28
End: 2018-10-30

## 2018-10-30 NOTE — TELEPHONE ENCOUNTER
Elena dow from  [facility]) and asking Dr Maxime Baires email ID to send something regard this pt. She request call back.

## 2018-11-01 ENCOUNTER — TELEPHONE (OUTPATIENT)
Dept: INTERNAL MEDICINE CLINIC | Age: 28
End: 2018-11-01

## 2019-01-02 RX ORDER — POLYETHYLENE GLYCOL 3350 17 G/17G
POWDER, FOR SOLUTION ORAL
Qty: 1 BOTTLE | Refills: 5 | Status: SHIPPED | OUTPATIENT
Start: 2019-01-02

## 2019-01-24 ENCOUNTER — TELEPHONE (OUTPATIENT)
Dept: INTERNAL MEDICINE CLINIC | Age: 29
End: 2019-01-24

## 2019-01-25 RX ORDER — SIMETHICONE 80 MG
80 TABLET,CHEWABLE ORAL EVERY 6 HOURS PRN
Qty: 180 TABLET | Refills: 3 | Status: SHIPPED | OUTPATIENT
Start: 2019-01-25

## 2019-02-01 DIAGNOSIS — Z87.440 HISTORY OF CYSTITIS: ICD-10-CM

## 2019-02-01 DIAGNOSIS — R30.0 DYSURIA: ICD-10-CM

## 2019-02-01 DIAGNOSIS — D72.829 LEUKOCYTOSIS, UNSPECIFIED TYPE: Primary | ICD-10-CM

## 2019-02-01 DIAGNOSIS — N39.0 FREQUENT UTI: ICD-10-CM

## 2019-02-13 ENCOUNTER — OFFICE VISIT (OUTPATIENT)
Dept: INTERNAL MEDICINE CLINIC | Age: 29
End: 2019-02-13
Payer: MEDICARE

## 2019-02-13 VITALS
HEIGHT: 66 IN | DIASTOLIC BLOOD PRESSURE: 60 MMHG | HEART RATE: 70 BPM | WEIGHT: 130 LBS | BODY MASS INDEX: 20.89 KG/M2 | SYSTOLIC BLOOD PRESSURE: 120 MMHG

## 2019-02-13 DIAGNOSIS — N30.00 ACUTE CYSTITIS WITHOUT HEMATURIA: ICD-10-CM

## 2019-02-13 DIAGNOSIS — R30.0 DYSURIA: ICD-10-CM

## 2019-02-13 DIAGNOSIS — J96.11 CHRONIC HYPOXEMIC RESPIRATORY FAILURE (HCC): ICD-10-CM

## 2019-02-13 DIAGNOSIS — I89.0 LYMPHEDEMA OF BOTH LOWER EXTREMITIES: ICD-10-CM

## 2019-02-13 DIAGNOSIS — G71.01 DUCHENNE MUSCULAR DYSTROPHY (HCC): ICD-10-CM

## 2019-02-13 DIAGNOSIS — I51.4 MYOCARDIAL FIBROSIS (HCC): ICD-10-CM

## 2019-02-13 DIAGNOSIS — S91.001A OPEN WOUND OF RIGHT ANKLE, INITIAL ENCOUNTER: Primary | ICD-10-CM

## 2019-02-13 LAB
BILIRUBIN, POC: NORMAL
BLOOD URINE, POC: NORMAL
CLARITY, POC: NORMAL
COLOR, POC: YELLOW
GLUCOSE URINE, POC: NORMAL
KETONES, POC: NORMAL
LEUKOCYTE EST, POC: NORMAL
NITRITE, POC: NORMAL
PH, POC: 7
PROTEIN, POC: NORMAL
REASON FOR REJECTION: NORMAL
REJECTED TEST: NORMAL
SPECIFIC GRAVITY, POC: 1.02
UROBILINOGEN, POC: NORMAL

## 2019-02-13 PROCEDURE — G8427 DOCREV CUR MEDS BY ELIG CLIN: HCPCS | Performed by: INTERNAL MEDICINE

## 2019-02-13 PROCEDURE — 1036F TOBACCO NON-USER: CPT | Performed by: INTERNAL MEDICINE

## 2019-02-13 PROCEDURE — G8420 CALC BMI NORM PARAMETERS: HCPCS | Performed by: INTERNAL MEDICINE

## 2019-02-13 PROCEDURE — 81002 URINALYSIS NONAUTO W/O SCOPE: CPT | Performed by: INTERNAL MEDICINE

## 2019-02-13 PROCEDURE — 99214 OFFICE O/P EST MOD 30 MIN: CPT | Performed by: INTERNAL MEDICINE

## 2019-02-13 PROCEDURE — G8484 FLU IMMUNIZE NO ADMIN: HCPCS | Performed by: INTERNAL MEDICINE

## 2019-02-13 RX ORDER — CLINDAMYCIN HYDROCHLORIDE 300 MG/1
300 CAPSULE ORAL 3 TIMES DAILY
Qty: 30 CAPSULE | Refills: 0 | Status: SHIPPED | OUTPATIENT
Start: 2019-02-13 | End: 2019-02-23

## 2019-02-13 ASSESSMENT — PATIENT HEALTH QUESTIONNAIRE - PHQ9
SUM OF ALL RESPONSES TO PHQ QUESTIONS 1-9: 0
1. LITTLE INTEREST OR PLEASURE IN DOING THINGS: 0
SUM OF ALL RESPONSES TO PHQ QUESTIONS 1-9: 0
2. FEELING DOWN, DEPRESSED OR HOPELESS: 0
SUM OF ALL RESPONSES TO PHQ9 QUESTIONS 1 & 2: 0

## 2019-02-15 LAB
GRAM STAIN RESULT: ABNORMAL
ORGANISM: ABNORMAL
WOUND/ABSCESS: ABNORMAL

## 2019-02-18 LAB
ANAEROBIC CULTURE: ABNORMAL
GRAM STAIN RESULT: ABNORMAL
ORGANISM: ABNORMAL
WOUND/ABSCESS: ABNORMAL

## 2019-02-20 ENCOUNTER — TELEPHONE (OUTPATIENT)
Dept: INTERNAL MEDICINE CLINIC | Age: 29
End: 2019-02-20

## 2019-02-21 ENCOUNTER — TELEPHONE (OUTPATIENT)
Dept: INTERNAL MEDICINE CLINIC | Age: 29
End: 2019-02-21

## 2019-02-21 DIAGNOSIS — S91.001D OPEN WOUND OF RIGHT ANKLE, SUBSEQUENT ENCOUNTER: Primary | ICD-10-CM

## 2019-02-21 RX ORDER — DOXYCYCLINE HYCLATE 100 MG
100 TABLET ORAL 2 TIMES DAILY
Qty: 20 TABLET | Refills: 0 | Status: SHIPPED | OUTPATIENT
Start: 2019-02-21 | End: 2019-03-03

## 2019-02-21 ASSESSMENT — ENCOUNTER SYMPTOMS
SHORTNESS OF BREATH: 0
CHEST TIGHTNESS: 0

## 2019-02-27 ENCOUNTER — TELEPHONE (OUTPATIENT)
Dept: INTERNAL MEDICINE CLINIC | Age: 29
End: 2019-02-27

## 2019-03-14 ENCOUNTER — TELEPHONE (OUTPATIENT)
Dept: INTERNAL MEDICINE CLINIC | Age: 29
End: 2019-03-14

## 2019-03-15 ENCOUNTER — CARE COORDINATION (OUTPATIENT)
Dept: CASE MANAGEMENT | Age: 29
End: 2019-03-15

## 2019-04-09 ENCOUNTER — TELEPHONE (OUTPATIENT)
Dept: INTERNAL MEDICINE CLINIC | Age: 29
End: 2019-04-09

## 2019-04-09 NOTE — TELEPHONE ENCOUNTER
Cathie Schmitt- I think that there is already a letter in his chart. Can you please check and we can edit to to add the EXACT bed that he needs. Thanks!

## 2019-04-10 NOTE — TELEPHONE ENCOUNTER
Sister calling to check on the status of the letter. He has a court date on 4/15/19. His  is basing his representation on this letter. His sister is hoping this can be expedited .

## 2019-04-19 ENCOUNTER — TELEPHONE (OUTPATIENT)
Dept: INTERNAL MEDICINE CLINIC | Age: 29
End: 2019-04-19

## 2019-04-19 NOTE — TELEPHONE ENCOUNTER
Derick Lance ( [facility]) call to request the EASE seating systems order fax to # [phone]. She request call back if any question.

## 2019-04-23 ENCOUNTER — CARE COORDINATION (OUTPATIENT)
Dept: CASE MANAGEMENT | Age: 29
End: 2019-04-23

## 2019-05-15 ENCOUNTER — OFFICE VISIT (OUTPATIENT)
Dept: INTERNAL MEDICINE CLINIC | Age: 29
End: 2019-05-15
Payer: MEDICARE

## 2019-05-15 VITALS — OXYGEN SATURATION: 97 % | HEART RATE: 92 BPM | SYSTOLIC BLOOD PRESSURE: 90 MMHG | DIASTOLIC BLOOD PRESSURE: 60 MMHG

## 2019-05-15 DIAGNOSIS — J96.11 CHRONIC HYPOXEMIC RESPIRATORY FAILURE (HCC): ICD-10-CM

## 2019-05-15 DIAGNOSIS — L89.323 PRESSURE INJURY OF LEFT BUTTOCK, STAGE 3 (HCC): ICD-10-CM

## 2019-05-15 DIAGNOSIS — I89.0 LYMPHEDEMA OF BOTH LOWER EXTREMITIES: Primary | ICD-10-CM

## 2019-05-15 DIAGNOSIS — S91.001S OPEN WOUND OF RIGHT ANKLE, SEQUELA: ICD-10-CM

## 2019-05-15 DIAGNOSIS — G71.01 DUCHENNE MUSCULAR DYSTROPHY (HCC): ICD-10-CM

## 2019-05-15 DIAGNOSIS — I10 ESSENTIAL HYPERTENSION: ICD-10-CM

## 2019-05-15 PROCEDURE — 1036F TOBACCO NON-USER: CPT | Performed by: INTERNAL MEDICINE

## 2019-05-15 PROCEDURE — G8427 DOCREV CUR MEDS BY ELIG CLIN: HCPCS | Performed by: INTERNAL MEDICINE

## 2019-05-15 PROCEDURE — G8420 CALC BMI NORM PARAMETERS: HCPCS | Performed by: INTERNAL MEDICINE

## 2019-05-15 PROCEDURE — 99214 OFFICE O/P EST MOD 30 MIN: CPT | Performed by: INTERNAL MEDICINE

## 2019-05-15 RX ORDER — LISINOPRIL 5 MG/1
5 TABLET ORAL DAILY
Qty: 30 TABLET | Refills: 5 | Status: SHIPPED
Start: 2019-05-15

## 2019-05-15 NOTE — PROGRESS NOTES
Patient: Blair Curtis is a 33 y.o. male who presents today with the following Chief Complaint(s):  Chief Complaint   Patient presents with    3 Month Follow-Up       HPI     Here today for follow up. Is still working on getting a hospital bed. Is getting a new seat cushion for his wheel chair. Legs are still very swollen. Wound is healing nicely per [facility]. Was following with ID for wound care/infection. Has not upcoming appointment that he is aware of. Was in hospital at [facility] in April for buttocks ulcer and in March for heel ulcer. Both are healing. Does follow with plastics. Was last seen 5/8/10. Saw pulmonology and cardiology at [address] [place] [address] [facility] last week as well. Cardiology did reduce carvedilol and lisinopril. [facility] yestereday (physical medicine, addressing DME needs to DMD). Dr. Latanya Melgar has ordered foot pumps for his swelling. Swelling is still bad as he is still not sleeping in a bed.  Still awaiting hospital bed approval.   No Known Allergies   Past Medical History:   Diagnosis Date    Chronic hypoxemic respiratory failure (HCC)     Duchenne muscular dystrophy     Hypertension     Lymphedema of both lower extremities     Muscular dystrophy     Myocardial fibrosis ([facility] [address]       Past Surgical History:   Procedure Laterality Date    GASTROSTOMY TUBE PLACEMENT  04/2017    closure 3/2018    SPINAL FUSION  2004      Social History     Socioeconomic History    Marital status: Single     Spouse name: Not on file    Number of children: Not on file    Years of education: Not on file    Highest education level: Not on file   Occupational History    Occupation: unemployed   Social Needs    Financial resource strain: Not on file    Food insecurity:     Worry: Not on file     Inability: Not on file   [employer] needs:     Medical: Not on file     Non-medical: Not on file   Tobacco Use    Smoking status: Never Smoker    Smokeless tobacco: Never Used   Substance and Sexual Activity    Alcohol use: Yes     Comment: rarely    Drug use: No    Sexual activity: Not on file   Lifestyle    Physical activity:     Days per week: Not on file     Minutes per session: Not on file    Stress: Not on file   Relationships    Social connections:     Talks on phone: Not on file     Gets together: Not on file     Attends Judaism service: Not on file     Active member of club or organization: Not on file     Attends meetings of clubs or organizations: Not on file     Relationship status: Not on file    Intimate partner violence:     Fear of current or ex partner: Not on file     Emotionally abused: Not on file     Physically abused: Not on file     Forced sexual activity: Not on file   Other Topics Concern    Not on file   Social History Narrative    Not on file     Family History   Problem Relation Age of Onset    Other Mother 35        car accident    Cancer Father 46        Leukemia    Dementia Paternal Grandmother     Lung Cancer Paternal Grandfather         Outpatient Medications Prior to Visit   Medication Sig Dispense Refill    simethicone (GAS RELIEF) 80 MG chewable tablet Take 1 tablet by mouth every 6 hours as needed for Flatulence 180 tablet 3    polyethylene glycol (GLYCOLAX) powder DISOLVE ONE CAPFUL (17gms) INTO 8 OZ OF WATER AND DRINK DAILY 1 Bottle 5    Gauze Pads & Dressings (POLYMEM DRESSING) 5\"X5\" PADS 1 each by Does not apply route daily 30 each 2    nystatin (MYCOSTATIN) 029657 UNIT/GM powder Apply 3 times daily. 57.6 g 5    Elastic Bandages & Supports (ACE BANDAGE SELF-ADHERING) MISC Comprilan wraps or similar. Will need 2-3 per leg. Wrap each leg in am starting at toes up to knees. Remove at HS. 6 each 0    fluticasone (FLONASE) 50 MCG/ACT nasal spray 2 sprays by Nasal route daily      loratadine (CLARITIN) 10 MG capsule Take 10 mg by mouth daily      menthol-zinc oxide (CALMOSEPTINE) 0.44-20.625 % OINT ointment Apply topically daily Max 30 ml per day. distress. Wheelchair bound, sip vent dependent   HENT:   Head: Normocephalic. Eyes: Pupils are equal, round, and reactive to light. Conjunctivae and EOM are normal. Right eye exhibits no discharge. Left eye exhibits no discharge. No scleral icterus. Neck: Carotid bruit is not present. No thyroid mass present. Cardiovascular: Normal rate, regular rhythm and normal heart sounds. No murmur heard. 1+ edema left leg, 4+++ pedal edema left foot- no skin breakdown. 1-2+ edema right leg, 4+ swelling around right ankle; 4+ swelling of right foot. Healing wound right posterior ankle. Feet look like water balloons. Pulmonary/Chest: Effort normal. He has no wheezes. He has no rales. He exhibits no tenderness. Abdominal: Soft. Bowel sounds are normal. He exhibits no mass. There is no tenderness. Musculoskeletal: Edema: b/l LE. Contractures at knees, ankles, hips, elbows, shoulders, wrists. Strength 0/5 all extremities. Minimal ability to adjust position in trunk. Able to hold up head. Neurological: He is alert and oriented to person, place, and time. He displays atrophy. He exhibits abnormal muscle tone (flaccid). Gait (non-ambulatory) abnormal.   Arm strength 2/5 b/l. Trunk weakness/some difficulty controlling trunk movements. Speech weak   Skin:   Posterior right ankle with open wound draining with yellow drainage. Psychiatric: He has a normal mood and affect. His behavior is normal. Judgment and thought content normal.       ASSESSMENT/PLAN:    Problem List Items Addressed This Visit     Chronic hypoxemic respiratory failure ([address]     Secondary to DMD. On Sip vent with cough assist and nocturnal BiPap. Was last seen by pulmonology last week. Duchenne muscular dystrophy     [facility] Still working on getting hospital bed approved.           Essential hypertension    Relevant Medications    lisinopril (PRINIVIL;ZESTRIL) 5 MG tablet    Lymphedema of both lower extremities - Primary     Secondary to DMD and not being able to fully elevate his legs at night. Follows with vascular. Has healing wound right ankle. Open wound of right ankle     [facility] and wound care. Is slowly healing. Impaired by his chronic LE edema. Pressure injury of left buttock, stage 3 ([address]     Admitted to [facility] in April. Following with plastics. Healing for [facility] aide. Current Outpatient Medications   Medication Sig Dispense Refill    lisinopril (PRINIVIL;ZESTRIL) 5 MG tablet Take 1 tablet by mouth daily Indications: Take is only taking 5 mg daily 30 tablet 5    simethicone (GAS RELIEF) 80 MG chewable tablet Take 1 tablet by mouth every 6 hours as needed for Flatulence 180 tablet 3    polyethylene glycol (GLYCOLAX) powder DISOLVE ONE CAPFUL (17gms) INTO 8 OZ OF WATER AND DRINK DAILY 1 Bottle 5    Gauze Pads & Dressings (POLYMEM DRESSING) 5\"X5\" PADS 1 each by Does not apply route daily 30 each 2    nystatin (MYCOSTATIN) 761278 UNIT/GM powder Apply 3 times daily. 57.6 g 5    Elastic Bandages & Supports (ACE BANDAGE SELF-ADHERING) MISC Comprilan wraps or similar. Will need 2-3 per leg. Wrap each leg in am starting at toes up to knees. Remove at HS. 6 each 0    fluticasone (FLONASE) 50 MCG/ACT nasal spray 2 sprays by Nasal route daily      loratadine (CLARITIN) 10 MG capsule Take 10 mg by mouth daily      menthol-zinc oxide (CALMOSEPTINE) 0.44-20.625 % OINT ointment Apply topically daily Max 30 ml per day.       petrolatum-zinc oxide 49-15 % OINT ointment Apply topically as needed      predniSONE (DELTASONE) 20 MG tablet Take 20 mg by mouth daily      spironolactone (ALDACTONE) 25 MG tablet Take 25 mg by mouth daily      alendronate (FOSAMAX) 70 MG tablet Take 1 tablet by mouth once a week      carvedilol (COREG) 12.5 MG tablet Take 6.25 mg by mouth 2 times daily       Cholecalciferol (VITAMIN D3) 1000 units TABS Take 2 tablets by mouth daily      Wound Dressings (ADAPTIC NON-ADHERING DRESSING) PADS Apply 1 each topically daily 30 each 2     No current facility-administered medications for this visit. Return in about 3 months (around 8/15/2019).

## 2019-05-19 PROBLEM — L89.323 PRESSURE INJURY OF LEFT BUTTOCK, STAGE 3 (HCC): Status: ACTIVE | Noted: 2019-05-19

## 2019-05-19 ASSESSMENT — ENCOUNTER SYMPTOMS
NAUSEA: 0
VOMITING: 0
SHORTNESS OF BREATH: 1

## 2019-05-19 NOTE — ASSESSMENT & PLAN NOTE
Secondary to DMD. On Sip vent with cough assist and nocturnal BiPap. Was last seen by pulmonology last week.

## 2019-05-19 NOTE — ASSESSMENT & PLAN NOTE
Admitted to [facility] in April. Following with plastics. Healing for [place] aide.

## 2019-05-19 NOTE — ASSESSMENT & PLAN NOTE
Secondary to DMD and not being able to fully elevate his legs at night. Follows with vascular. Has healing wound right ankle.

## 2019-06-17 RX ORDER — POLYETHYLENE GLYCOL 3350 17 G/17G
POWDER, FOR SOLUTION ORAL
Qty: 510 G | Refills: 1 | Status: SHIPPED | OUTPATIENT
Start: 2019-06-17

## 2019-08-05 ENCOUNTER — TELEPHONE (OUTPATIENT)
Dept: INTERNAL MEDICINE CLINIC | Age: 29
End: 2019-08-05

## 2019-08-05 NOTE — TELEPHONE ENCOUNTER
[employer] office calling---this pt passed away on Friday and they need his last 2 office visits right away---please fax to [phone]. detailed exam

## 2021-04-20 NOTE — TELEPHONE ENCOUNTER
Ok to do but will likely need to call them as to what this is and why he needs to for documentation. Will likely need to come in to be seen for this.  saw Patient has been updated via [email or web address]